# Patient Record
Sex: MALE | Race: WHITE | URBAN - NONMETROPOLITAN AREA
[De-identification: names, ages, dates, MRNs, and addresses within clinical notes are randomized per-mention and may not be internally consistent; named-entity substitution may affect disease eponyms.]

---

## 2018-03-01 ENCOUNTER — DOCUMENTATION ONLY (OUTPATIENT)
Dept: FAMILY MEDICINE | Facility: OTHER | Age: 48
End: 2018-03-01

## 2018-06-28 ENCOUNTER — OFFICE VISIT (OUTPATIENT)
Dept: FAMILY MEDICINE | Facility: OTHER | Age: 48
End: 2018-06-28
Attending: NURSE PRACTITIONER
Payer: COMMERCIAL

## 2018-06-28 VITALS
DIASTOLIC BLOOD PRESSURE: 68 MMHG | SYSTOLIC BLOOD PRESSURE: 118 MMHG | WEIGHT: 223.7 LBS | TEMPERATURE: 99 F | HEIGHT: 69 IN | RESPIRATION RATE: 18 BRPM | HEART RATE: 72 BPM | BODY MASS INDEX: 33.13 KG/M2

## 2018-06-28 DIAGNOSIS — R22.0 FACIAL SWELLING: Primary | ICD-10-CM

## 2018-06-28 DIAGNOSIS — L23.7 ALLERGIC CONTACT DERMATITIS DUE TO PLANT: ICD-10-CM

## 2018-06-28 PROCEDURE — 25000128 H RX IP 250 OP 636: Performed by: NURSE PRACTITIONER

## 2018-06-28 PROCEDURE — 99214 OFFICE O/P EST MOD 30 MIN: CPT | Performed by: NURSE PRACTITIONER

## 2018-06-28 PROCEDURE — G0463 HOSPITAL OUTPT CLINIC VISIT: HCPCS | Mod: 25 | Performed by: NURSE PRACTITIONER

## 2018-06-28 PROCEDURE — 96372 THER/PROPH/DIAG INJ SC/IM: CPT

## 2018-06-28 PROCEDURE — 25000132 ZZH RX MED GY IP 250 OP 250 PS 637: Performed by: NURSE PRACTITIONER

## 2018-06-28 RX ORDER — DIPHENHYDRAMINE HCL 25 MG
50 CAPSULE ORAL ONCE
Status: COMPLETED | OUTPATIENT
Start: 2018-06-28 | End: 2018-06-28

## 2018-06-28 RX ORDER — PREDNISONE 20 MG/1
TABLET ORAL
Qty: 18 TABLET | Refills: 0 | Status: SHIPPED | OUTPATIENT
Start: 2018-06-28

## 2018-06-28 RX ORDER — METHYLPREDNISOLONE SODIUM SUCCINATE 125 MG/2ML
125 INJECTION, POWDER, LYOPHILIZED, FOR SOLUTION INTRAMUSCULAR; INTRAVENOUS ONCE
Status: COMPLETED | OUTPATIENT
Start: 2018-06-28 | End: 2018-06-28

## 2018-06-28 RX ADMIN — METHYLPREDNISOLONE SODIUM SUCCINATE 125 MG: 125 INJECTION, POWDER, FOR SOLUTION INTRAMUSCULAR; INTRAVENOUS at 17:26

## 2018-06-28 RX ADMIN — DIPHENHYDRAMINE HYDROCHLORIDE 50 MG: 25 CAPSULE ORAL at 17:26

## 2018-06-28 ASSESSMENT — PAIN SCALES - GENERAL: PAINLEVEL: MILD PAIN (2)

## 2018-06-28 NOTE — NURSING NOTE
Patient presents to the clinic for what he believes is posion ivy/oak or sumac. Was in the woods Monday and started to feel bad Monday night. Has progressively gotten worse.   Johnna Pena LPN............. June 28, 2018 5:00 PM

## 2018-06-28 NOTE — PROGRESS NOTES
"HPI:    Fam Suarez is a 48 year old male  who presents to clinic today for rash.    Rash started on arms and has spread to face and groin.   Rash is not painful.  Rash is some times itchy.  Weeping from rash on arms and face.  Facial swelling started today.  Mild sensation of throat tightening today.  Mild chest tightness and mild shortness of breath today.  No fevers or chills.  No new medications.  No known exposures other than walking outside with his dog in the grass and along the road way.  Denies any eye pain or vision loss.  No numbness or tingling.  No weakness.  Normal energy.  Normal appetite.  No headaches or body aches.    Taking Ibuprofen (last yesterday, none today).  Using Calamine.  No benadryl.        No past medical history on file.  No past surgical history on file.  Social History   Substance Use Topics     Smoking status: Former Smoker     Types: Cigarettes     Smokeless tobacco: Former User     Quit date: 9/5/2004     Alcohol use No      Comment: Alcoholic Drinks/day: weekends     No current outpatient prescriptions on file.     Allergies   Allergen Reactions     Tree Nuts  [Nuts] Anaphylaxis         Past medical history, past surgical history, current medications and allergies reviewed and accurate to the best of my knowledge.        ROS:  Refer to HPI    /68 (BP Location: Right arm, Patient Position: Sitting, Cuff Size: Adult Regular)  Pulse 72  Temp 99  F (37.2  C) (Tympanic)  Resp 18  Ht 5' 9\" (1.753 m)  Wt 223 lb 11.2 oz (101.5 kg)  BMI 33.03 kg/m2    EXAM:  General Appearance:  Miserable appearing adult male, appropriate appearance for age. No acute distress  Head: normocephalic, atraumatic  Eyes: conjunctivae normal without erythema or irritation, no drainage or crusting, generalized left eyelid swelling/edema, pupils equal   Orophayrnx: moist mucous membranes, posterior pharynx without erythema, tonsils without hypertrophy, no erythema, no exudates or petechiae, no post " nasal drip seen, no trismus, no swelling noted, able to swallow, voice clear.    Neck: supple without adenopathy  Respiratory: normal chest wall and respirations.  Normal effort.  Clear to auscultation bilaterally, no wheezing, crackles or rhonchi.  No increased work of breathing.  No cough appreciated.   Cardiac: RRR with no murmurs  Musculoskeletal:  Normal gait.  Equal movement of bilateral upper extremities.  Equal movement of bilateral lower extremities.    Dermatological: Entire face with scattered erythematous raised vesicular patches and plaques, left side of face with swelling of forehead, eyelids, and ear.  Left forehead with active weeping from open plaque.  Bilateral forearms with mild scattered erythematous vesicular patches and erythematous raised linear vesicular appearance.  Bilateral antecubital fossas with large erythematous weeping vesicular plaques.    Psychological: normal affect, alert and pleasant          ASSESSMENT/PLAN:    ICD-10-CM    1. Facial swelling R22.0 diphenhydrAMINE (BENADRYL) capsule 50 mg     methylPREDNISolone sodium succinate (solu-MEDROL) injection 125 mg     predniSONE (DELTASONE) 20 MG tablet   2. Allergic contact dermatitis due to plant L23.7 diphenhydrAMINE (BENADRYL) capsule 50 mg     methylPREDNISolone sodium succinate (solu-MEDROL) injection 125 mg     predniSONE (DELTASONE) 20 MG tablet         Exam consistent with plant dermatitis - severe case with majority of face involved.    Solumedrol 125 mg IM injection administered in clinic.  Benadryl 50 mg oral administered in clinic.    Prednisone taper - 60 mg daily x 3 days then 40 mg daily x 3 days then 20 mg daily x 3 days - take in morning with food, start tomorrow.    Benadryl 25 to 50 mg Q 6 to 8 hours PRN - encouraged to take at minimum at bedtime    Zyrtec 2 tabs in morning for pruritis and histamine     Zantac 150 mg daily for pruritis and histamine    Use calamine lotion frequently to help dry up weeping  lesions    Discussed importance of washing clothing, bedding, pillow cases, towels daily, etc     Symptomatic treatment - Encouraged fluidscool compresses, avoid heat and hot water, hand hygiene, avoid touching open spots or drainage, avoid scratching, etc     Discussed warning signs/symptoms indicative of need to f/u    Follow up if symptoms persist or worsen or concerns

## 2018-06-28 NOTE — MR AVS SNAPSHOT
After Visit Summary   6/28/2018    Fam Suarez    MRN: [id number]           Patient Information     Date Of Birth          1970        Visit Information        Provider Department      6/28/2018 5:00 PM Unique Stevens NP [facility]        Today's Diagnoses     Facial swelling    -  1    Allergic contact dermatitis due to plant          Care Instructions    Solumedrol injection given in clinic for swelling    Benadryl given in clinic for itching      Prednisone 3 tabs daily x 3 days then 2 tabs daily x 3 days then 1 tab daily x 3 days - take with food in morning    Benadryl every 8 hours as needed for itching    Zyrtec 2 tabs daily in the morning as needed for itching    Zantac 150 mg daily for itching    Use calamine to dry up weeping lesions    Wash bedding, pillow cases, towels, etc daily    Follow up if worsening or concerns      Managing a Poison Ivy, Poison Oak, or Poison Sumac Reaction  If you come in contact with urushiol    If you think you may have come in contact with the sap oil (called urushiol) contained in poison ivy, poison oak, or poison sumac plants, wash the affected part of your skin. Do this within 15 minutes after contact. Use water or preferably, soap and water.  Undress, and wash your clothes and gear as soon as you can. Be sure to wash any pet that was with you. Taking these steps can help prevent spreading sap oil to someone else. If you have a rash, but are not sure if it is from one of these plants, see your healthcare provider.  To soothe the itching  Your skin may react to poison oak, poison ivy, and poison sumac within hours to a few days after contact. Once you have come into contact with these plants, you can t stop the reaction. But you can take these steps to soothe the itching:    Don t scratch or scrub your rash. Not even if the itching is severe. Scratching can lead to infection.    Bathe in lukewarm (not hot) water. Or take short  cool showers to relieve the itching. For a more soothing bath, add oatmeal to the water.    Use antihistamines that are taken by mouth. These include diphenhydramine. You can buy these at the pharmacy. Talk to your healthcare provider or pharmacist for more information on oral antihistamines.    Use over-the-counter treatments on your skin. These include cortisone and calamine lotion.  How your skin may react  A mild rash may become red, swollen, and itchy. The rash may form a line on your skin where you brushed against the plant. If you have a severe rash, your itching may worsen. And your rash may blister and ooze. If this happens, seek medical care. The fluid from your blisters will not make your rash spread. With or without medical care, your rash may last up to 3 weeks. In the future, try to avoid coming in contact with these plants.  When to call your healthcare provider  Call your healthcare provider if:    Your rash is severe    The rash spreads beyond the exposed part of your body or affects your face.    The rash does not clear up within a few weeks  You may be given medicine to take by mouth or apply directly on the skin.  Call 911  Call 911 if you have any of the following:    Trouble breathing or swallowing    Any significant swelling  Date Last Reviewed: 3/1/2017    8579-7931 [employer]. [address] [place] [address]. All rights reserved. This information is not intended as a substitute for professional medical care. Always follow your healthcare professional's instructions.        General Allergic Reactions  An allergic reaction is a set of symptoms caused by an allergen. An allergen is something that causes a person s immune system to react. When a person comes in contact with an allergen, it causes the body to release chemicals. These include the chemical histamine. Histamine causes swelling and itching. It may affect the entire body. This is called a general allergic  reaction. Often symptoms affect only 1 part of the body. This is called a local allergic reaction.  You are having an allergic reaction. Almost anything can cause one. Different people are allergic to different things. It is usually something that you ate or swallowed, came into contact with by getting or putting it on your skin or clothes, or something you breathed in the air. This can be very annoying and sometimes scary.  Most of us think of allergic reactions when we have a rash or itchy skin. Symptoms can include:    Itching of the eyes, nose, and roof of the mouth    Runny or stuffy nose    Watery eyes     Sneezing or coughing     A blocked feeling in the ear    Red, itchy rash called hives    Red and purple spots    Rash, redness, welts, blisters    Itching, burning, stinging, pain    Dry, flaky, cracking, scaly skin  Severe symptoms include:    Swelling of the face, lips, or other parts of the body    Hoarse voice    Trouble swallowing, feeling like your throat is closing    Trouble breathing, wheezing    Nausea, vomiting, diarrhea, stomach cramps    Feeling faint or lightheaded, rapid heart rate  Sometimes the cause may be obvious. But there are so many things that can cause a reaction that you may not be able to figure out. The most important things to help find your allergen are:    Remembering when it started    What you were doing at the time or just before that    Any activities you were involved in    Any new products or contacts  Below are some common causes. But remember that almost anything can cause a reaction. You may not even be aware that you came into contact with one of these things:    Dust, mold, pollen    Plants (common ones are poison ivy and poison oak, but there are many others)     Animals    Foods such as shrimp, shellfish, peanuts, milk products, gluten, and eggs. Also food colorings, flavorings, and additives.    Insect bites or stings such as bees, mosquitos, fleas,  ticks    Medicines such as penicillin, sulfa medicines, amoxicillin, aspirin, and ibuprofen. But any medicine can cause a reaction.    Jewelry such as nickel or gold. This can be new, or something you ve worn for a while, including zippers and buttons.    Latex such as in gloves, clothes, toys, balloons, or some tapes. Some people allergic to latex may also have problems with foods like bananas, avocados, kiwi, papaya, or chestnuts.    Lotions, perfumes, cosmetics, soaps, shampoos, skincare products, nail products    Chemicals or dyes in clothing, linen, , hair dyes, soaps, iodine  Many viruses and common colds can cause a rash that is not an allergic reaction. Sometimes it is hard to tell the difference between allergies, sensitivity, or an intolerance to something. This is especially true with food. Many things can cause diarrhea, vomiting, stomach cramps, and skin irritation.  Home care    The goal of treatment is to help relieve the symptoms and get you feeling better. The rash will usually fade over several days. But it can sometimes last a couple of weeks. Over the next couple of days, there may be times when it is gets a little worse, and then better again. Here are some things to do:    If you know what you are allergic to, stay away from it. Future reactions could be worse than this one.    Avoid tight clothing and anything that heats up your skin (hot showers or baths, direct sunlight). Heat will make itching worse.    An ice pack will relieve local areas of intense itching and redness. To make an ice pack, put ice cubes in a plastic bag that seals at the top. Wrap it in a thin, clean towel. Don t put the ice directly on the skin because it can damage the skin.    Oral diphenhydramine is an over-the-counter antihistamine sold at pharmacy and grocery stores. Unless a prescription antihistamine was given, diphenhydramine may be used to reduce itching if large areas of the skin are involved. It may  make you sleepy. So be careful using it in the daytime or when going to school, working, or driving. Note: Don t use diphenhydramine if you have glaucoma or if you are a man with trouble urinating due to an enlarged prostate. There are other antihistamines that won t make you so sleepy. These are good choices for daytime use. Ask your pharmacist for suggestions.    Don t use diphenhydramine cream on your skin. It can cause a further reaction in some people.    To help prevent an infection, don't scratch the affected area. Scratching may worsen the reaction and damage your skin. It can also lead to an infection. Always check the affected for signs of an infection.    Call your healthcare provider and ask what you can use to help decrease the itching.    To decrease allergic reactions, try the following:      Use heat-steam to clean your home    Use high-efficiency particulate (HEPA) vacuums and filters    Stay away from food and pet triggers    Kill any cockroaches    Clean your house often  Follow-up care  Follow up with your healthcare provider, or as advised. If you had a severe reaction today, or if you have had several mild to medium allergic reactions in the past, ask your provider about allergy testing. This can help you find out what you are allergic to. If your reaction included dizziness, fainting, or trouble breathing or swallowing, ask your provider about carrying auto-injectable epinephrine.  Call 911  Call 911 if any of these occur:    Trouble breathing or swallowing, wheezing    Cool, moist, pale skin    Shortness of breath    Hoarse voice or trouble speaking    Confused     Very drowsy or trouble awakening    Fainting or loss of consciousness    Rapid heart rate    Feeling of dizziness or weakness or a sudden drop in blood pressure    Feeling of doom    Feeling lightheaded    Severe nausea or vomiting, or diarrhea    Seizure    Swelling in the face, eyelids, lips, mouth, throat or  tongue    Drooling  When to seek medical advice  Call your healthcare provider right away if any of these occur:    Spreading areas of itching, redness or swelling    Nausea or stomach cramps or abdominal pain    Continuing or recurring symptoms    Spreading areas of redness, swelling, or itching    Signs of infection at the affected site:  ? Spreading redness  ? Increased pain or swelling  ? Fluid or colored drainage from the site  ? Fever of 100.4 F (38 C) or above lasting for 24 to 48 hours, or as directed by your provider  Date Last Reviewed: 3/1/2017    6135-3560 [employer]. [address] [place] [address]. All rights reserved. This information is not intended as a substitute for professional medical care. Always follow your healthcare professional's instructions.        Understanding Contact Dermatitis     A cool, moist compress can help reduce itching.     Contact dermatitis is a common type of skin rash. It s caused by something that touches the skin and makes it irritated and inflamed. It can occur on skin on any part of the body, such as the face, neck, hands, arms, and legs. Contact dermatitis is not spread from person to person.  Often, the reaction of contact dermatitis occurs 1 to 2 days after contact with the offending agent.  How to say it  CAYETANO-tact [id number]   What causes contact dermatitis?  It s caused by something that irritates the skin, or that creates an allergic reaction on the skin. People can get contact dermatitis from many kinds of things. These include:    Plant oils in poison ivy, oak, and sumac    Chemicals in household , solvents, and glue    Chemicals in makeup, soap, laundry detergent, perfume, acne cream, and hair products    Certain medicines, such as neomycin, bacitracin, benzocaine, and thimerosal    Metals such as nickel, found in some jewelry and watch bands     The sticky material on the back of bandages and tape (adhesive)    Things  that can cause tiny breaks in the skin, such as wood, fiberglass, metal tools, and plant thorns    Rubber latex in surgical gloves and other medical supplies  Dermatitis can also be caused by the skin being damp for long periods of time. This can happen from washing your hands too often, or working with wet materials.  Symptoms of contact dermatitis  Symptoms can include skin that is:    Blistered    Burning    Cracked    Dry    Itchy    Painful    Red    Rough, thickened, and leathery    Swollen    Warm  The blisters may ooze fluid and form crusts.  Treatment for contact dermatitis  Treatment is done to help relieve itching and reduce inflammation. The rash should go away in a few days to a few weeks. Treatments include:    Cool, moist compress. Use a clean damp cloth. Put it on the area for 20 to 30 minutes, 5 to 6 times a day for the first 3 days.    Steroid cream or ointment. You can apply this medicine several times a day on clean skin.    Oral corticosteroid. Your healthcare provider may prescribe this medicine if you have severe skin symptoms on a large part of your body.  Your healthcare provider may give you a steroid injection instead of pills.    Oral antihistamine. This medicine can help reduce itching.    Colloidal oatmeal bath. Soaking in water with colloidal oatmeal can help soothe skin.    Plain cream, lotion, or ointment. Cream, lotion, or ointment without medicine can help to soothe and protect your skin.  Living with contact dermatitis  Talk with your healthcare provider about what may have caused your contact dermatitis. Patch testing may help you figure out what caused the rash so you can avoid further contact with it. Once you learn what caused your rash, make sure to avoid that substance. If your skin comes into contact with it again, make sure to wash your skin right away. If you can t avoid the substance, wear gloves or other protective clothing before you touch it. Or use a cream, lotion,  "or ointment to protect your skin.  When to call your healthcare provider  Call your healthcare provider right away if you have any of these:    Fever of 100.4 F (38 C) or higher, or as directed    Symptoms that don t get better, or get worse    New symptoms   Date Last Reviewed: 5/1/2016 2000-2017 The [employer]. [address] [place] [address]. All rights reserved. This information is not intended as a substitute for professional medical care. Always follow your healthcare professional's instructions.                Follow-ups after your visit        Who to contact     If you have questions or need follow up information about today's clinic visit or your schedule please contact [facility] directly at [phone].  Normal or non-critical lab and imaging results will be communicated to you by MyChart, letter or phone within 4 business days after the clinic has received the results. If you do not hear from us within 7 days, please contact the clinic through MyChart or phone. If you have a critical or abnormal lab result, we will notify you by phone as soon as possible.  Submit refill requests through [employer] or call your pharmacy and they will forward the refill request to us. Please allow 3 business days for your refill to be completed.          Additional Information About Your Visit        Care EveryWhere ID     This is your Care EveryWhere ID. This could be used by other organizations to access your [facility] medical records  [id number]        Your Vitals Were     Pulse Temperature Respirations Height BMI (Body Mass Index)       72 99  F (37.2  C) (Tympanic) 18 5' 9\" (1.753 m) 33.03 kg/m2        Blood Pressure from Last 3 Encounters:   06/28/18 118/68   11/21/16 138/82   11/21/16 138/82    Weight from Last 3 Encounters:   06/28/18 223 lb 11.2 oz (101.5 kg)   11/21/16 214 lb (97.1 kg)   11/21/16 214 lb (97.1 kg)              Today, you had the following     No orders " found for display         Today's Medication Changes          These changes are accurate as of 6/28/18  5:31 PM.  If you have any questions, ask your nurse or doctor.               Start taking these medicines.        Dose/Directions    predniSONE 20 MG tablet   Commonly known as:  DELTASONE   Used for:  Facial swelling, Allergic contact dermatitis due to plant   Started by:  Unique Stevens NP        3 tabs daily x 3 days then 2 tab daily x 3 days then 1 tab daily x 3 days.  Take in morning with food.   Quantity:  18 tablet   Refills:  0            Where to get your medicines      These medications were sent to [id number] - [place], [place] - [address]  [address], [facility] [address]     Phone:  [phone]     predniSONE 20 MG tablet                Primary Care Provider Fax #    Physician No Ref-Primary [phone]       No address on file        Equal Access to Services     NOBLE AGUAYO : Hadii winston plasenciao Sozan, waaxda luqadaha, qaybta kaalmada adetezyada, elizabeth wakefield . So [facility] [phone].    ATENCIÓN: Si habla español, tiene a scott disposición servicios gratuitos de asistencia lingüística. Llame al [phone].    We comply with applicable federal civil rights laws and [place] laws. We do not discriminate on the basis of race, color, national origin, age, disability, sex, sexual orientation, or gender identity.            Thank you!     Thank you for choosing [facility]  for your care. Our goal is always to provide you with excellent care. Hearing back from our patients is one way we can continue to improve our services. Please take a few minutes to complete the written survey that you may receive in the mail after your visit with us. Thank you!             Your Updated Medication List - Protect others around you: Learn how to safely use, store and throw away your medicines at  www.disposemymeds.org.          This list is accurate as of 6/28/18  5:31 PM.  Always use your most recent med list.                   Brand Name Dispense Instructions for use Diagnosis    predniSONE 20 MG tablet    DELTASONE    18 tablet    3 tabs daily x 3 days then 2 tab daily x 3 days then 1 tab daily x 3 days.  Take in morning with food.    Facial swelling, Allergic contact dermatitis due to plant

## 2024-12-14 ENCOUNTER — HOSPITAL ENCOUNTER (EMERGENCY)
Facility: OTHER | Age: 54
Discharge: HOME | End: 2024-12-14
Attending: FAMILY MEDICINE

## 2024-12-14 VITALS
DIASTOLIC BLOOD PRESSURE: 85 MMHG | BODY MASS INDEX: 27.2 KG/M2 | SYSTOLIC BLOOD PRESSURE: 141 MMHG | WEIGHT: 190 LBS | TEMPERATURE: 98.5 F | OXYGEN SATURATION: 95 % | HEART RATE: 108 BPM | RESPIRATION RATE: 13 BRPM | HEIGHT: 70 IN

## 2024-12-14 DIAGNOSIS — T78.40XA ALLERGIC REACTION, INITIAL ENCOUNTER: Primary | ICD-10-CM

## 2024-12-14 PROCEDURE — 99291 CRITICAL CARE FIRST HOUR: CPT | Mod: 25 | Performed by: FAMILY MEDICINE

## 2024-12-14 PROCEDURE — 250N000009 HC RX 250

## 2024-12-14 PROCEDURE — 99283 EMERGENCY DEPT VISIT LOW MDM: CPT | Performed by: FAMILY MEDICINE

## 2024-12-14 PROCEDURE — 96375 TX/PRO/DX INJ NEW DRUG ADDON: CPT | Performed by: FAMILY MEDICINE

## 2024-12-14 PROCEDURE — 96374 THER/PROPH/DIAG INJ IV PUSH: CPT | Performed by: FAMILY MEDICINE

## 2024-12-14 PROCEDURE — 250N000011 HC RX IP 250 OP 636

## 2024-12-14 PROCEDURE — 250N000011 HC RX IP 250 OP 636: Performed by: FAMILY MEDICINE

## 2024-12-14 PROCEDURE — 96372 THER/PROPH/DIAG INJ SC/IM: CPT

## 2024-12-14 RX ORDER — DIPHENHYDRAMINE HYDROCHLORIDE 50 MG/ML
25 INJECTION INTRAMUSCULAR; INTRAVENOUS ONCE
Status: DISCONTINUED | OUTPATIENT
Start: 2024-12-14 | End: 2024-12-14 | Stop reason: HOSPADM

## 2024-12-14 RX ORDER — DIPHENHYDRAMINE HYDROCHLORIDE 50 MG/ML
50 INJECTION INTRAMUSCULAR; INTRAVENOUS ONCE
Status: COMPLETED | OUTPATIENT
Start: 2024-12-14 | End: 2024-12-14

## 2024-12-14 RX ORDER — DEXAMETHASONE SODIUM PHOSPHATE 10 MG/ML
10 INJECTION, SOLUTION INTRAMUSCULAR; INTRAVENOUS ONCE
Status: COMPLETED | OUTPATIENT
Start: 2024-12-14 | End: 2024-12-14

## 2024-12-14 RX ORDER — DEXAMETHASONE SODIUM PHOSPHATE 4 MG/ML
10 VIAL (ML) INJECTION ONCE
Status: DISCONTINUED | OUTPATIENT
Start: 2024-12-14 | End: 2024-12-14

## 2024-12-14 RX ORDER — LIDOCAINE 40 MG/G
CREAM TOPICAL
Status: DISCONTINUED | OUTPATIENT
Start: 2024-12-14 | End: 2024-12-14 | Stop reason: HOSPADM

## 2024-12-14 RX ORDER — METHYLPREDNISOLONE SODIUM SUCCINATE 125 MG/2ML
125 INJECTION INTRAMUSCULAR; INTRAVENOUS ONCE
Status: COMPLETED | OUTPATIENT
Start: 2024-12-14 | End: 2024-12-14

## 2024-12-14 RX ADMIN — DEXAMETHASONE SODIUM PHOSPHATE 10 MG: 10 INJECTION INTRAMUSCULAR; INTRAVENOUS at 15:47

## 2024-12-14 RX ADMIN — EPINEPHRINE 0.5 MG: 1 INJECTION INTRAMUSCULAR; INTRAVENOUS; SUBCUTANEOUS at 15:40

## 2024-12-14 RX ADMIN — METHYLPREDNISOLONE SODIUM SUCCINATE 125 MG: 125 INJECTION, POWDER, FOR SOLUTION INTRAMUSCULAR; INTRAVENOUS at 15:44

## 2024-12-14 RX ADMIN — DIPHENHYDRAMINE HYDROCHLORIDE 50 MG: 50 INJECTION, SOLUTION INTRAMUSCULAR; INTRAVENOUS at 15:44

## 2024-12-14 RX ADMIN — FAMOTIDINE 20 MG: 10 INJECTION, SOLUTION INTRAVENOUS at 15:48

## 2024-12-14 ASSESSMENT — COLUMBIA-SUICIDE SEVERITY RATING SCALE - C-SSRS
1. IN THE PAST MONTH, HAVE YOU WISHED YOU WERE DEAD OR WISHED YOU COULD GO TO SLEEP AND NOT WAKE UP?: NO
2. HAVE YOU ACTUALLY HAD ANY THOUGHTS OF KILLING YOURSELF IN THE PAST MONTH?: NO
6. HAVE YOU EVER DONE ANYTHING, STARTED TO DO ANYTHING, OR PREPARED TO DO ANYTHING TO END YOUR LIFE?: NO

## 2024-12-14 ASSESSMENT — ACTIVITIES OF DAILY LIVING (ADL)
ADLS_ACUITY_SCORE: 41

## 2024-12-14 NOTE — ED PROVIDER NOTES
"[facility]  Emergency Department Visit Note    Allergic Reaction      History of Present Illness     HPI:  Fam Suarez is a 54 year old male presenting with blistering of his tongue, swelling of his tongue and lips and feeling like his airway is narrowing after being exposed to walnuts.  He has a known tree nut allergy and had an unanticipated exposure.  He took 50 mg of benadryl 10 minutes ago.      Review of Systems:  10 point review of systems obtained and pertinent positive and negative findings noted in HPI. Review of systems otherwise negative.  Medications:  Reviewed in Epic Allergies:  Reviewed in Epic Problem List:  Reviewed in Epic   Past Medical History:  Reviewed in Epic Past Surgical History:  Reviewed in Epic Social History:  Reviewed in Epic       Physical Exam   Vital signs: Pulse 106   Temp 98.5  F (36.9  C) (Tympanic)   Resp 24   Ht 1.778 m (5' 10\")   Wt 86.2 kg (190 lb)   SpO2 97%   BMI 27.26 kg/m    Physical Exam  Constitutional:       General: He is not in acute distress.     Appearance: Normal appearance. He is not ill-appearing, toxic-appearing or diaphoretic.   HENT:      Mouth/Throat:      Comments: Mild swelling of the lips and tongue, pharynx is patent  Cardiovascular:      Rate and Rhythm: Normal rate and regular rhythm.   Pulmonary:      Effort: Pulmonary effort is normal.      Breath sounds: Normal breath sounds. No stridor.   Abdominal:      General: Abdomen is flat. Bowel sounds are normal.      Palpations: Abdomen is soft.      Tenderness: There is no abdominal tenderness.   Lymphadenopathy:      Cervical: No cervical adenopathy.   Skin:     General: Skin is warm and dry.   Neurological:      Mental Status: He is alert and oriented to person, place, and time.           ED Course        Procedures                  No results found for this or any previous visit (from the past 24 hours).    Medications   lidocaine 1 % 0.1-1 mL (has no administration in " time range)   lidocaine (LMX4) cream (has no administration in time range)   sodium chloride (PF) 0.9% PF flush 3 mL (has no administration in time range)   sodium chloride (PF) 0.9% PF flush 3 mL (has no administration in time range)   diphenhydrAMINE (BENADRYL) injection 25 mg ( Intravenous Canceled Entry 12/14/24 1545)   diphenhydrAMINE (BENADRYL) injection 50 mg (50 mg Intravenous $Given 12/14/24 1544)   famotidine (PEPCID) injection 20 mg (20 mg Intravenous $Given 12/14/24 1548)   methylPREDNISolone Na Suc (solu-MEDROL) injection 125 mg (125 mg Intravenous $Given 12/14/24 1544)   EPINEPHrine (ADRENALIN) kit 0.3-0.5 mg (0.5 mg Intramuscular $Given 12/14/24 1540)   dexAMETHasone PF (DECADRON) injection 10 mg (10 mg Intravenous $Given 12/14/24 1547)       Medical Decision Making     Fam Suarez is a 54 year old male presenting with allergic reaction to known tree nut allergy exposure.  He was given epinephrine, decadron, benadryl and symptoms improved.  He was monitored for 3 hours.  He was stable on discharge.     Patient given instructions on follow-up and warning signs for which to return to ED. All questions were answered and the patient is comfortable with plan for discharge. The patient was discharged in stable condition or transferred in as stable condition as possible.    I have reviewed the patient's Medical Imaging and Medical Records.  I have reviewed the nursing notes.  I have reviewed the findings, diagnosis, plan and need for follow up with the patient.    Diagnosis & Disposition     Diagnosis:  1. Allergic reaction, initial encounter        Discharge disposition:  Discharged to home       Follow-up: Follow up with primary care provider in prn days       [facility] Emergency Department  Gretta Santoro MD  Family Medicine     Gretta Santoro MD  12/14/24 [id number]
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
## 2025-07-02 ENCOUNTER — HOSPITAL ENCOUNTER (EMERGENCY)
Facility: OTHER | Age: 55
Discharge: HOME | End: 2025-07-02
Attending: PHYSICIAN ASSISTANT
Payer: COMMERCIAL

## 2025-07-02 ENCOUNTER — APPOINTMENT (OUTPATIENT)
Dept: CT IMAGING | Facility: OTHER | Age: 55
End: 2025-07-02
Attending: PHYSICIAN ASSISTANT
Payer: COMMERCIAL

## 2025-07-02 VITALS
WEIGHT: 232 LBS | TEMPERATURE: 98.7 F | RESPIRATION RATE: 18 BRPM | HEIGHT: 70 IN | HEART RATE: 94 BPM | OXYGEN SATURATION: 95 % | BODY MASS INDEX: 33.21 KG/M2

## 2025-07-02 DIAGNOSIS — S20.212A RIB CONTUSION, LEFT, INITIAL ENCOUNTER: ICD-10-CM

## 2025-07-02 DIAGNOSIS — S39.81XA: ICD-10-CM

## 2025-07-02 LAB
ANION GAP SERPL CALCULATED.3IONS-SCNC: 12 MMOL/L (ref 7–15)
ATRIAL RATE - MUSE: 90 BPM
BASOPHILS # BLD AUTO: 0.1 10E3/UL (ref 0–0.2)
BASOPHILS NFR BLD AUTO: 1 %
BUN SERPL-MCNC: 14.3 MG/DL (ref 6–20)
CALCIUM SERPL-MCNC: 9.2 MG/DL (ref 8.8–10.4)
CHLORIDE SERPL-SCNC: 101 MMOL/L (ref 98–107)
CREAT SERPL-MCNC: 0.89 MG/DL (ref 0.67–1.17)
DIASTOLIC BLOOD PRESSURE - MUSE: NORMAL MMHG
EGFRCR SERPLBLD CKD-EPI 2021: >90 ML/MIN/1.73M2
EOSINOPHIL # BLD AUTO: 0.1 10E3/UL (ref 0–0.7)
EOSINOPHIL NFR BLD AUTO: 1 %
ERYTHROCYTE [DISTWIDTH] IN BLOOD BY AUTOMATED COUNT: 12.5 % (ref 10–15)
GLUCOSE SERPL-MCNC: 102 MG/DL (ref 70–99)
HCO3 SERPL-SCNC: 25 MMOL/L (ref 22–29)
HCT VFR BLD AUTO: 38.8 % (ref 40–53)
HGB BLD-MCNC: 13.6 G/DL (ref 13.3–17.7)
HOLD SPECIMEN: NORMAL
HOLD SPECIMEN: NORMAL
IMM GRANULOCYTES # BLD: 0 10E3/UL
IMM GRANULOCYTES NFR BLD: 0 %
INTERPRETATION ECG - MUSE: NORMAL
LYMPHOCYTES # BLD AUTO: 1.9 10E3/UL (ref 0.8–5.3)
LYMPHOCYTES NFR BLD AUTO: 27 %
MCH RBC QN AUTO: 28.7 PG (ref 26.5–33)
MCHC RBC AUTO-ENTMCNC: 35.1 G/DL (ref 31.5–36.5)
MCV RBC AUTO: 82 FL (ref 78–100)
MONOCYTES # BLD AUTO: 0.6 10E3/UL (ref 0–1.3)
MONOCYTES NFR BLD AUTO: 8 %
NEUTROPHILS # BLD AUTO: 4.5 10E3/UL (ref 1.6–8.3)
NEUTROPHILS NFR BLD AUTO: 63 %
NRBC # BLD AUTO: 0 10E3/UL
NRBC BLD AUTO-RTO: 0 /100
P AXIS - MUSE: 33 DEGREES
PLATELET # BLD AUTO: 224 10E3/UL (ref 150–450)
POTASSIUM SERPL-SCNC: 4 MMOL/L (ref 3.4–5.3)
PR INTERVAL - MUSE: 144 MS
QRS DURATION - MUSE: 88 MS
QT - MUSE: 358 MS
QTC - MUSE: 437 MS
R AXIS - MUSE: -7 DEGREES
RBC # BLD AUTO: 4.74 10E6/UL (ref 4.4–5.9)
SODIUM SERPL-SCNC: 138 MMOL/L (ref 135–145)
SYSTOLIC BLOOD PRESSURE - MUSE: NORMAL MMHG
T AXIS - MUSE: -4 DEGREES
TROPONIN T SERPL HS-MCNC: <6 NG/L
VENTRICULAR RATE- MUSE: 90 BPM
WBC # BLD AUTO: 7.1 10E3/UL (ref 4–11)

## 2025-07-02 PROCEDURE — 93010 ELECTROCARDIOGRAM REPORT: CPT | Performed by: INTERNAL MEDICINE

## 2025-07-02 PROCEDURE — 71260 CT THORAX DX C+: CPT

## 2025-07-02 PROCEDURE — 85025 COMPLETE CBC W/AUTO DIFF WBC: CPT | Performed by: PHYSICIAN ASSISTANT

## 2025-07-02 PROCEDURE — 80048 BASIC METABOLIC PNL TOTAL CA: CPT | Performed by: PHYSICIAN ASSISTANT

## 2025-07-02 PROCEDURE — 71260 CT THORAX DX C+: CPT | Mod: 26 | Performed by: RADIOLOGY

## 2025-07-02 PROCEDURE — 84484 ASSAY OF TROPONIN QUANT: CPT | Performed by: PHYSICIAN ASSISTANT

## 2025-07-02 PROCEDURE — 36415 COLL VENOUS BLD VENIPUNCTURE: CPT | Performed by: PHYSICIAN ASSISTANT

## 2025-07-02 PROCEDURE — 74177 CT ABD & PELVIS W/CONTRAST: CPT | Mod: 26 | Performed by: RADIOLOGY

## 2025-07-02 PROCEDURE — G0390 TRAUMA RESPONS W/HOSP CRITI: HCPCS | Performed by: PHYSICIAN ASSISTANT

## 2025-07-02 PROCEDURE — 250N000011 HC RX IP 250 OP 636: Performed by: PHYSICIAN ASSISTANT

## 2025-07-02 PROCEDURE — 250N000009 HC RX 250: Performed by: PHYSICIAN ASSISTANT

## 2025-07-02 PROCEDURE — 99285 EMERGENCY DEPT VISIT HI MDM: CPT | Performed by: PHYSICIAN ASSISTANT

## 2025-07-02 PROCEDURE — 99285 EMERGENCY DEPT VISIT HI MDM: CPT | Mod: 25 | Performed by: PHYSICIAN ASSISTANT

## 2025-07-02 PROCEDURE — 93005 ELECTROCARDIOGRAM TRACING: CPT | Performed by: PHYSICIAN ASSISTANT

## 2025-07-02 RX ORDER — IOPAMIDOL 755 MG/ML
133 INJECTION, SOLUTION INTRAVASCULAR ONCE
Status: COMPLETED | OUTPATIENT
Start: 2025-07-02 | End: 2025-07-02

## 2025-07-02 RX ADMIN — SODIUM CHLORIDE 60 ML: 9 INJECTION, SOLUTION INTRAVENOUS at 15:00

## 2025-07-02 RX ADMIN — IOPAMIDOL 133 ML: 755 INJECTION, SOLUTION INTRAVENOUS at 15:00

## 2025-07-02 ASSESSMENT — ENCOUNTER SYMPTOMS
COUGH: 0
HEMATURIA: 0
FEVER: 0
FLANK PAIN: 1
CHILLS: 0
ABDOMINAL PAIN: 0
SHORTNESS OF BREATH: 0

## 2025-07-02 ASSESSMENT — ACTIVITIES OF DAILY LIVING (ADL)
ADLS_ACUITY_SCORE: 42

## 2025-07-02 ASSESSMENT — COLUMBIA-SUICIDE SEVERITY RATING SCALE - C-SSRS
6. HAVE YOU EVER DONE ANYTHING, STARTED TO DO ANYTHING, OR PREPARED TO DO ANYTHING TO END YOUR LIFE?: NO
1. IN THE PAST MONTH, HAVE YOU WISHED YOU WERE DEAD OR WISHED YOU COULD GO TO SLEEP AND NOT WAKE UP?: NO
2. HAVE YOU ACTUALLY HAD ANY THOUGHTS OF KILLING YOURSELF IN THE PAST MONTH?: NO
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
## 2025-07-02 NOTE — DISCHARGE INSTRUCTIONS
Get plenty of fluids and rest.  As discussed, all of your studies appeared very stable for us today.  However, with the location of your discomfort and the mechanism of injury, it is possible you suffered a rib contusion.  Use your incentive spirometer as directed.  Alternate ice and heat every 4 hours, you can alternate Tylenol and ibuprofen as well.  I expect gradual improvement of symptoms over the coming weeks.  For the next 24 to 48 hours however you may be more sore and stiff than you are now.  Expect that, however, if you have pain out of proportion when you think it should be or other worsening or concerning symptoms then please return for further evaluation.  Follow-up with PCP as needed.

## (undated) RX ORDER — DIPHENHYDRAMINE HYDROCHLORIDE 50 MG/ML
INJECTION INTRAMUSCULAR; INTRAVENOUS
Status: DISPENSED
Start: 2024-12-14

## (undated) RX ORDER — DEXAMETHASONE SODIUM PHOSPHATE 10 MG/ML
INJECTION, SOLUTION INTRAMUSCULAR; INTRAVENOUS
Status: DISPENSED
Start: 2024-12-14

## (undated) RX ORDER — METHYLPREDNISOLONE SODIUM SUCCINATE 125 MG/2ML
INJECTION INTRAMUSCULAR; INTRAVENOUS
Status: DISPENSED
Start: 2024-12-14